# Patient Record
Sex: MALE | Race: WHITE | NOT HISPANIC OR LATINO | Employment: FULL TIME | URBAN - METROPOLITAN AREA
[De-identification: names, ages, dates, MRNs, and addresses within clinical notes are randomized per-mention and may not be internally consistent; named-entity substitution may affect disease eponyms.]

---

## 2023-10-11 ENCOUNTER — OFFICE VISIT (OUTPATIENT)
Dept: FAMILY MEDICINE | Facility: CLINIC | Age: 33
End: 2023-10-11
Payer: COMMERCIAL

## 2023-10-11 ENCOUNTER — MYC MEDICAL ADVICE (OUTPATIENT)
Dept: FAMILY MEDICINE | Facility: CLINIC | Age: 33
End: 2023-10-11

## 2023-10-11 VITALS
RESPIRATION RATE: 16 BRPM | BODY MASS INDEX: 28.72 KG/M2 | HEART RATE: 100 BPM | SYSTOLIC BLOOD PRESSURE: 110 MMHG | WEIGHT: 200.6 LBS | OXYGEN SATURATION: 99 % | DIASTOLIC BLOOD PRESSURE: 68 MMHG | HEIGHT: 70 IN | TEMPERATURE: 98.6 F

## 2023-10-11 DIAGNOSIS — K59.09 CHRONIC CONSTIPATION: ICD-10-CM

## 2023-10-11 DIAGNOSIS — K42.9 UMBILICAL HERNIA WITHOUT OBSTRUCTION AND WITHOUT GANGRENE: ICD-10-CM

## 2023-10-11 DIAGNOSIS — N41.0 ACUTE PROSTATITIS: ICD-10-CM

## 2023-10-11 DIAGNOSIS — Z85.72 HISTORY OF BURKITT'S LYMPHOMA: ICD-10-CM

## 2023-10-11 DIAGNOSIS — R19.09 UMBILICAL MASS: Primary | ICD-10-CM

## 2023-10-11 DIAGNOSIS — Q43.1 HIRSCHSPRUNG'S DISEASE (H): ICD-10-CM

## 2023-10-11 LAB
ALBUMIN UR-MCNC: NEGATIVE MG/DL
APPEARANCE UR: CLEAR
BILIRUB UR QL STRIP: NEGATIVE
COLOR UR AUTO: YELLOW
GLUCOSE UR STRIP-MCNC: NEGATIVE MG/DL
HGB UR QL STRIP: NEGATIVE
KETONES UR STRIP-MCNC: NEGATIVE MG/DL
LEUKOCYTE ESTERASE UR QL STRIP: NEGATIVE
NITRATE UR QL: NEGATIVE
PH UR STRIP: 7 [PH] (ref 5–7)
SP GR UR STRIP: 1.01 (ref 1–1.03)
UROBILINOGEN UR STRIP-ACNC: 0.2 E.U./DL

## 2023-10-11 PROCEDURE — 99203 OFFICE O/P NEW LOW 30 MIN: CPT | Performed by: FAMILY MEDICINE

## 2023-10-11 PROCEDURE — 81003 URINALYSIS AUTO W/O SCOPE: CPT | Mod: QW | Performed by: FAMILY MEDICINE

## 2023-10-11 RX ORDER — SULFAMETHOXAZOLE/TRIMETHOPRIM 800-160 MG
1 TABLET ORAL 2 TIMES DAILY
Qty: 28 TABLET | Refills: 0 | Status: SHIPPED | OUTPATIENT
Start: 2023-10-11

## 2023-10-11 NOTE — PROGRESS NOTES
Assessment & Plan   Problem List Items Addressed This Visit          Nervous and Auditory    Hirschsprung's disease (H28)       Digestive    Chronic constipation       Other    History of Burkitt's lymphoma    Relevant Orders    CT Abdomen Pelvis w Contrast     Other Visit Diagnoses       Umbilical mass    -  Primary    Relevant Orders    CT Abdomen Pelvis w Contrast    Acute prostatitis        Relevant Medications    sulfamethoxazole-trimethoprim (BACTRIM DS) 800-160 MG tablet    Other Relevant Orders    UA Macroscopic with reflex to Microscopic and Culture - Clinic Collect    Umbilical hernia without obstruction and without gangrene        Relevant Orders    Adult General Surg Referral        33-year-old male who has had tender mass at his umbilicus.  At the age of 22 he had Burkitt's lymphoma.  This was treated with chemotherapy.  He noticed an enlarging lymph node in his right neck.  He also has notable history of Hirschsprung disease with abdominal surgery as an infant.  He has associated chronic constipation with this.  He has not had any unintentional weight loss or night sweats.  He has had 2 quit working out because of the mass at the umbilicus.  At times it seems as if the pain that originates at the umbilicus goes down into his groin.  He also has some deep rectal pain and pressure with mild pain with urination.    Umbilical wall mass most likely it is a hernia however on exam it was nonreducible.  Not as firm as a lymph node associated with lymphoma.  We will plan to get CT scan of the abdomen pelvis to confirm that this is a hernia and not a lymph node.  We will also place referral to general surgery.  In regards to Hirschsprung disease and chronic constipation I have updated his medical chart to bring attention to this for his general surgery consult and also cautioned patient that if he ends up needing a narcotic would like patient to be proactive with either some Senokot-S or MiraLAX to use if he  "is in need of narcotic pain medication.    Symptoms consistent with acute prostatitis we will get patient started on some Bactrim after we get a UA UC today.    Return to clinic if symptoms worsen or do not improve.           BMI:   Estimated body mass index is 28.78 kg/m  as calculated from the following:    Height as of this encounter: 1.778 m (5' 10\").    Weight as of this encounter: 91 kg (200 lb 9.6 oz).   Weight management plan: Discussed healthy diet and exercise guidelines        Teresa Soliz MD  [facility]    Tia Vizcarra is a 33 year old, presenting for the following health issues:  Hernia (Pt c/o possible hernia (umbilical and possible groin) x 3 week. He works out and thinks maybe he was straining too much. )        10/11/2023     8:22 AM   Additional Questions   Roomed by Olive       History of Present Illness       Reason for visit:  Possible hernia  Symptom onset:  3-4 weeks ago  Symptoms include:  Dull pain  Symptom intensity:  Mild  Symptom progression:  Staying the same  Had these symptoms before:  No  What makes it worse:  Lifting heavy weight  What makes it better:  Sleep    He eats 4 or more servings of fruits and vegetables daily.He exercises with enough effort to increase his heart rate 30 to 60 minutes per day.  He exercises with enough effort to increase his heart rate 5 days per week.   He is taking medications regularly.                 Review of Systems         Objective    /68 (BP Location: Right arm, Patient Position: Sitting)   Pulse 100   Temp 98.6  F (37  C) (Tympanic)   Resp 16   Ht 1.778 m (5' 10\")   Wt 91 kg (200 lb 9.6 oz)   SpO2 99%   BMI 28.78 kg/m    Body mass index is 28.78 kg/m .  Physical Exam     General: alert and oriented ×3 no acute distress.    HEENT: pupils are equal round and reactive to light extraocular motion is intact. Normocephalic and atraumatic.     Hearing is grossly normal and there is no otorrhea. "     Chest: has bilateral rise with no increased work of breathing.    Cardiovascular: normal perfusion and brisk capillary refill.    Musculoskeletal: no gross focal abnormalities and normal gait.    Neuro: no gross focal abnormalities and memory seems intact.    Psychiatric: speech is clear and coherent and fluent. Patient dressed appropriately for the weather. Mood is appropriate and affect is full.    Abd 1 cm mass at umbilicus, nonreducible, no skin breakdown.

## 2023-10-13 ENCOUNTER — HOSPITAL ENCOUNTER (OUTPATIENT)
Dept: CT IMAGING | Facility: HOSPITAL | Age: 33
Discharge: HOME | End: 2023-10-13
Attending: FAMILY MEDICINE | Admitting: FAMILY MEDICINE
Payer: COMMERCIAL

## 2023-10-13 DIAGNOSIS — R19.09 UMBILICAL MASS: ICD-10-CM

## 2023-10-13 DIAGNOSIS — Z85.72 HISTORY OF BURKITT'S LYMPHOMA: ICD-10-CM

## 2023-10-13 PROCEDURE — 74177 CT ABD & PELVIS W/CONTRAST: CPT

## 2023-10-13 PROCEDURE — 250N000011 HC RX IP 250 OP 636: Mod: JZ | Performed by: FAMILY MEDICINE

## 2023-10-13 RX ORDER — IOPAMIDOL 755 MG/ML
90 INJECTION, SOLUTION INTRAVASCULAR ONCE
Status: COMPLETED | OUTPATIENT
Start: 2023-10-13 | End: 2023-10-13

## 2023-10-13 RX ADMIN — IOPAMIDOL 90 ML: 755 INJECTION, SOLUTION INTRAVENOUS at 11:57

## 2023-10-21 ENCOUNTER — HEALTH MAINTENANCE LETTER (OUTPATIENT)
Age: 33
End: 2023-10-21

## 2023-10-23 ENCOUNTER — OFFICE VISIT (OUTPATIENT)
Dept: SURGERY | Facility: CLINIC | Age: 33
End: 2023-10-23
Payer: COMMERCIAL

## 2023-10-23 VITALS — HEIGHT: 70 IN | BODY MASS INDEX: 28.59 KG/M2 | WEIGHT: 199.7 LBS

## 2023-10-23 DIAGNOSIS — K42.9 UMBILICAL HERNIA WITHOUT OBSTRUCTION AND WITHOUT GANGRENE: ICD-10-CM

## 2023-10-23 PROCEDURE — 99203 OFFICE O/P NEW LOW 30 MIN: CPT | Performed by: SURGERY

## 2023-10-23 RX ORDER — TAMSULOSIN HYDROCHLORIDE 0.4 MG/1
CAPSULE ORAL
COMMUNITY
Start: 2023-10-17

## 2023-10-23 RX ORDER — MELOXICAM 7.5 MG/1
1 TABLET ORAL
COMMUNITY
Start: 2023-10-17

## 2023-10-23 NOTE — PROGRESS NOTES
"HPI:  Zackery Prescott is a 33 year old male who was referred to me by No Ref-Primary, Physician for an umbilical hernia.  He presents with complaints of a bulge at the umbilical region with minimal dicomfort.   He has noted this for the past several months and states he has had some intermittent discomfort but denies any significant pain affecting his quality of life.  He denies any nausea, vomiting, fevers, chills, bloody bowel movements or any other complaints at this time.     Allergies:Bactrim [sulfamethoxazole-trimethoprim]    No past medical history on file.    Past Surgical History:   Procedure Laterality Date    ABDOMEN SURGERY      as an infant for hirschprungs disease       CURRENT MEDS:  Current Outpatient Medications   Medication Sig Dispense Refill    meloxicam (MOBIC) 7.5 MG tablet Take 1 tablet by mouth daily at 2 pm      tamsulosin (FLOMAX) 0.4 MG capsule       sulfamethoxazole-trimethoprim (BACTRIM DS) 800-160 MG tablet Take 1 tablet by mouth 2 times daily (Patient not taking: Reported on 10/23/2023) 28 tablet 0       No family history on file.     reports that he has never smoked. He has never been exposed to tobacco smoke. He has never used smokeless tobacco.    Review of Systems -   The 12 point review of systems  is within normal limits except for as mentioned above in the HPI.  General ROS: No complaints or constitutional symptoms  Ophthalmic ROS: No complaints of visual changes  Skin: No complaints or symptoms   Endocrine: No complaints or symptoms  Hematologic/Lymphatic: No symptoms or complaints  Psychiatric: No symptoms or complaints  Respiratory ROS: no cough, shortness of breath, or wheezing  Cardiovascular ROS: no chest pain or dyspnea on exertion  Gastrointestinal ROS: As per HPI  Genito-Urinary ROS: no dysuria, trouble voiding, or hematuria  Musculoskeletal ROS: no joint or muscle pain  Neurological ROS: no TIA or stroke symptoms      Ht 1.778 m (5' 10\")   Wt 90.6 kg (199 lb 11.2 oz) "   BMI 28.65 kg/m    Body mass index is 28.65 kg/m .    EXAM:  GENERAL: Well developed male  HEENT: Extra ocular muscles intact, pupils are round and reactive, sclera is anicteric,   NECK:  No obvious masses or deformities  ABDOMEN: Soft, palpable small umbilical hernia with chronically partially preperitoneal adipose tissue  NEURO: No obvious defects noted.  EXT: No edema, no obvious deformities or any other abnormalities    IMAGES: CT scan images demonstrate a very small umbilical hernia    Assessment/Plan:    Zackery Prescott is a 33 year old male with an umbilical hernia.  The pathophysiology of umbilical hernias was discussed as were the surgical and non-operative management strategies.      The risks of surgery were discussed which include, but are not limited to, bleeding, infection, recurrent hernia, chronic pain, poor cosmesis, blood clots, stroke, heart attack and death.  Additionally, the risks of observation were discussed which include, but are not limited to, enlargement of the hernia, incarceration, strangulation, pain and death.      He understands everything which was discussed .  At this point the hernia is not affecting his quality of life.  I think we can hold off on surgery for sleep and his active lifestyle.  If you would like to have surgery at any time he can follow-up with me for ongoing discussion.    Jules Lyons D.O., FACS  [phone]  [facility] Department of Surgery

## 2023-10-23 NOTE — LETTER
10/23/2023         RE: Zackery Prescott  [address] [place] [address]        Dear Colleague,    Thank you for referring your patient, Zackery Prescott, to the [facility] [place]. Please see a copy of my visit note below.    HPI:  Zackery Prescott is a 33 year old male who was referred to me by No Ref-Primary, Physician for an umbilical hernia.  He presents with complaints of a bulge at the umbilical region with minimal dicomfort.   He has noted this for the past several months and states he has had some intermittent discomfort but denies any significant pain affecting his quality of life.  He denies any nausea, vomiting, fevers, chills, bloody bowel movements or any other complaints at this time.     Allergies:Bactrim [sulfamethoxazole-trimethoprim]    No past medical history on file.    Past Surgical History:   Procedure Laterality Date     ABDOMEN SURGERY      as an infant for hirschprungs disease       CURRENT MEDS:  Current Outpatient Medications   Medication Sig Dispense Refill     meloxicam (MOBIC) 7.5 MG tablet Take 1 tablet by mouth daily at 2 pm       tamsulosin (FLOMAX) 0.4 MG capsule        sulfamethoxazole-trimethoprim (BACTRIM DS) 800-160 MG tablet Take 1 tablet by mouth 2 times daily (Patient not taking: Reported on 10/23/2023) 28 tablet 0       No family history on file.     reports that he has never smoked. He has never been exposed to tobacco smoke. He has never used smokeless tobacco.    Review of Systems -   The 12 point review of systems  is within normal limits except for as mentioned above in the HPI.  General ROS: No complaints or constitutional symptoms  Ophthalmic ROS: No complaints of visual changes  Skin: No complaints or symptoms   Endocrine: No complaints or symptoms  Hematologic/Lymphatic: No symptoms or complaints  Psychiatric: No symptoms or complaints  Respiratory ROS: no cough, shortness of breath, or wheezing  Cardiovascular  "ROS: no chest pain or dyspnea on exertion  Gastrointestinal ROS: As per HPI  Genito-Urinary ROS: no dysuria, trouble voiding, or hematuria  Musculoskeletal ROS: no joint or muscle pain  Neurological ROS: no TIA or stroke symptoms      Ht 1.778 m (5' 10\")   Wt 90.6 kg (199 lb 11.2 oz)   BMI 28.65 kg/m    Body mass index is 28.65 kg/m .    EXAM:  GENERAL: Well developed male  HEENT: Extra ocular muscles intact, pupils are round and reactive, sclera is anicteric,   NECK:  No obvious masses or deformities  ABDOMEN: Soft, palpable small umbilical hernia with chronically partially preperitoneal adipose tissue  NEURO: No obvious defects noted.  EXT: No edema, no obvious deformities or any other abnormalities    IMAGES: CT scan images demonstrate a very small umbilical hernia    Assessment/Plan:    Zackery Prescott is a 33 year old male with an umbilical hernia.  The pathophysiology of umbilical hernias was discussed as were the surgical and non-operative management strategies.      The risks of surgery were discussed which include, but are not limited to, bleeding, infection, recurrent hernia, chronic pain, poor cosmesis, blood clots, stroke, heart attack and death.  Additionally, the risks of observation were discussed which include, but are not limited to, enlargement of the hernia, incarceration, strangulation, pain and death.      He understands everything which was discussed .  At this point the hernia is not affecting his quality of life.  I think we can hold off on surgery for sleep and his active lifestyle.  If you would like to have surgery at any time he can follow-up with me for ongoing discussion.    Jules Lyons D.O. [facility]  [phone]  [facility] Department of Surgery      Again, thank you for allowing me to participate in the care of your patient.        Sincerely,        Venancio Lyons, DO  "

## 2024-12-08 ENCOUNTER — HEALTH MAINTENANCE LETTER (OUTPATIENT)
Age: 34
End: 2024-12-08